# Patient Record
Sex: FEMALE | Race: BLACK OR AFRICAN AMERICAN | NOT HISPANIC OR LATINO | URBAN - NONMETROPOLITAN AREA
[De-identification: names, ages, dates, MRNs, and addresses within clinical notes are randomized per-mention and may not be internally consistent; named-entity substitution may affect disease eponyms.]

---

## 2017-10-18 NOTE — PATIENT DISCUSSION
no sig Rx shift since Feb 2017 visit but notes significant change in night driving glare in the last couple months.  However PCO not very significant I recommend night driving DVOs to help.  Rx given.  YAG is worse at night or persistent even with DVO Rx.

## 2020-02-24 NOTE — PATIENT DISCUSSION
Dermatologist Dr. Frantz Patterson, discussed if needed in the future, refer to  [facility].

## 2020-02-24 NOTE — PATIENT DISCUSSION
Recommend direct brow lift with laser buff through insurance (discussed risks and benefits of sx. .. ).

## 2020-12-01 ENCOUNTER — IMPORTED ENCOUNTER (OUTPATIENT)
Dept: URBAN - NONMETROPOLITAN AREA CLINIC 1 | Facility: CLINIC | Age: 85
End: 2020-12-01

## 2020-12-01 PROBLEM — H52.4: Noted: 2020-12-01

## 2020-12-01 PROBLEM — H25.13: Noted: 2020-12-01

## 2020-12-01 PROCEDURE — S0620 ROUTINE OPHTHALMOLOGICAL EXA: HCPCS

## 2020-12-01 NOTE — PATIENT DISCUSSION
Presbyopia OUDiscussed refractive status in detail with patient. MR done today but do not recommend updating due to cataract progression. Continue to monitor. Joseph OUDiscussed diagnosis with patient. Reviewed symptoms related to cataract progression. Discussed various treatment options with patient. Recommend cataract evaluation by Dr. Fernando Neri. Patient defers treatment at this time. Continue to monitor.

## 2021-02-03 ENCOUNTER — IMPORTED ENCOUNTER (OUTPATIENT)
Dept: URBAN - NONMETROPOLITAN AREA CLINIC 1 | Facility: CLINIC | Age: 86
End: 2021-02-03

## 2021-02-03 PROBLEM — H25.813: Noted: 2021-02-03

## 2021-02-03 PROCEDURE — 92014 COMPRE OPH EXAM EST PT 1/>: CPT

## 2021-02-03 NOTE — PATIENT DISCUSSION
Cataract(s)-Visually significant cataract OU .-Cataract(s) causing symptomatic impairment of visual function not correctable with a tolerable change in glasses or contact lenses lighting or non-operative means resulting in specific activity limitations and/or participation restrictions including but not limited to reading viewing television driving or meeting vocational or recreational needs. -Expectation is clearer vision and functional improvement in symptoms as well as reduced glare disability after cataract removal.-Order IOLMaster and OPD today. -Recommend Stand/Trad  based on today's OPD testing and lifestyle questionnaire.-All questions were answered regarding surgery including pre and post-op medications appointments activity restrictions and anesthetic usage.-The risks benefits and alternatives and special risk factors for the patient were discussed in detail including but not limited to: bleeding infection retinal detachment vitreous loss problems with the implant and possible need for additional surgery.-Although rare the possibility of complete vision loss was discussed.-The possible need for glasses post-operatively was discussed.-Order medical clearance exam based on history of hypertension -Patient elects to proceed with cataract surgery OS. Will schedule at patient's convenience and re-evaluate OD  in the future. Discussed all lens w/ patient and family Pt elects Stand/Trad and he wishes to proceed.  Will do patient under general anthesia

## 2021-02-25 ENCOUNTER — IMPORTED ENCOUNTER (OUTPATIENT)
Dept: URBAN - NONMETROPOLITAN AREA CLINIC 1 | Facility: CLINIC | Age: 86
End: 2021-02-25

## 2021-02-25 PROBLEM — Z01.818: Noted: 2021-02-25

## 2021-02-25 PROBLEM — I10: Noted: 2021-02-25

## 2021-02-25 PROCEDURE — 99212 OFFICE O/P EST SF 10 MIN: CPT

## 2021-02-25 NOTE — PATIENT DISCUSSION
"""Medical Clearance-Medical clearance done today. -No outstanding concerns that would preclude surgery.-Patient is cleared to proceed with scheduled surgery. "

## 2021-03-11 ENCOUNTER — IMPORTED ENCOUNTER (OUTPATIENT)
Dept: URBAN - NONMETROPOLITAN AREA CLINIC 1 | Facility: CLINIC | Age: 86
End: 2021-03-11

## 2021-03-11 PROBLEM — Z98.42: Noted: 2021-03-11

## 2021-03-11 PROBLEM — H25.811: Noted: 2021-03-11

## 2021-03-11 PROCEDURE — 99024 POSTOP FOLLOW-UP VISIT: CPT

## 2021-03-11 NOTE — PATIENT DISCUSSION
1 Day POV CE OS 3/10/21 Standard w Dimple Osborne- Discussed diagnosis in detail with patient- Patient is stable and doing well- Wound intact- Continue all post op drops as directed- Instilled a drop of Alphagan in OS today pressure 18- Continue to monitor- RTC 1 week POV Cataract OD -Visually significant.-Cataract causing symptomatic impairment of visual function not correctable with a tolerable change in glasses or contact lenses lighting or non-operative means resulting in specific activity limitations and/or participation restrictions including but not limited to reading viewing television driving or meeting vocational or recreational needs. -Expectation is clearer vision and reduced glare disability after cataract removal.-Refer to Dr Rosa De La Rosa for cataract evaluation

## 2021-03-16 ENCOUNTER — IMPORTED ENCOUNTER (OUTPATIENT)
Dept: URBAN - NONMETROPOLITAN AREA CLINIC 1 | Facility: CLINIC | Age: 86
End: 2021-03-16

## 2021-03-16 PROCEDURE — 99024 POSTOP FOLLOW-UP VISIT: CPT

## 2021-03-16 NOTE — PATIENT DISCUSSION
Cataract(s)-Visually significant cataract OD . -Cataract(s) causing symptomatic impairment of visual function not correctable with a tolerable change in glasses or contact lenses lighting or non-operative means resulting in specific activity limitations and/or participation restrictions including but not limited to reading viewing television driving or meeting vocational or recreational needs. -Expectation is clearer vision and functional improvement in symptoms as well as reduced glare disability after cataract removal.-Recommend Stand/Trad based on previous OPD testing and lifestyle questionnaire.-All questions were answered regarding surgery including pre and post-op medications appointments activity restrictions and anesthetic usage.-The risks benefits and alternatives and special risk factors for the patient were discussed in detail including but not limited to: bleeding infection retinal detachment vitreous loss problems with the implant and possible need for additional surgery.-Although rare the possibility of complete vision loss was discussed.-The need for glasses post-operatively was discussed.-Patient elects to proceed with cataract surgery OD . Will schedule at patient's convenience. *PT STATED TODAY HE WISHES TO HOLD OFF ON HAVING SURGERY OD AT THIS TIME AND RE EVALUATE AFTER OS HAS HAD ENOUGH TIME TO HEAL*s/p PCIOL-Pt doing well at 1 week s/p PCIOL. -Continue post-op gtts according to instruction sheet.-Okay to resume usual activites and d/c eye shield. -Patient to start Haydee 128 antoni QHS and Haydee 128 QID  Rx sent to pharmacy. -Discussed dense cataract and healing and post op time. -Will see patient again in 3 week for POV

## 2021-04-13 ENCOUNTER — IMPORTED ENCOUNTER (OUTPATIENT)
Dept: URBAN - NONMETROPOLITAN AREA CLINIC 1 | Facility: CLINIC | Age: 86
End: 2021-04-13

## 2021-04-13 PROCEDURE — 99024 POSTOP FOLLOW-UP VISIT: CPT

## 2021-04-13 NOTE — PATIENT DISCUSSION
s/p PCIOL 1 Month POV CE OS Stand/Trad + Dextenza -Pt doing well at 1 week s/p PCIOL. -Continue post-op gtts according to instruction sheet.-Okay to resume usual activites and d/c eye shield. Cataract OD -Visually significant.-Cataract(s) causing symptomatic impairment of visual function not correctable with a tolerable change in glasses or contact lenses lighting or non-operative means resulting in specific activity limitations and/or participation restrictions including but not limited to reading viewing television driving or meeting vocational or recreational needs. -Expectation is clearer vision and reduced glare disability after cataract removal.-Patient defers treatment at this time.

## 2022-01-11 NOTE — PROCEDURE NOTE: SURGICAL
<p>Prior to commencing surgery patient identification, surgical procedure, site, and side were confirmed by Dr. Coni Lopez. <span>&nbsp; </span>Following topical proparacaine anesthesia, the patient was positioned at the YAG laser, a contact lens coupled to the cornea of the right eye with methylcellulose and an axial posterior capsulotomy performed without complication using 3.0 Mj x 42. Attention was then turned to the left eye and a contact lens coupled to the cornea of the left eye with methylcellulose and an axial posterior capsulotomy performed without complication using 3.0 Mj x 24. One drop of Alphagan was instilled in both eyes and the patient returned to the holding area having tolerated the procedure well and without complication. </p>MR [id number]

## 2022-04-09 ASSESSMENT — VISUAL ACUITY
OD_GLARE: 20/100
OD_PAM: 20/25
OD_GLARE: 20/100
OD_PAM: 20/25
OS_AM: 20/70-
OS_CC: 20/100
OD_PH: 20/NI
OS_SC: 20/60
OD_SC: 20/50
OD_CC: 20/80+
OS_PH: 20/NI
OD_PAM: 20/25
OD_PH: 20/NI
OD_GLARE: 20/100
OD_SC: 20/80
OD_SC: 20/63
OS_SC: CF4'
OD_CC: 20/70
OS_PH: 20/NI
OS_AM: 20/70-
OU_CC: 20/40-

## 2022-04-09 ASSESSMENT — TONOMETRY
OD_IOP_MMHG: 11
OD_IOP_MMHG: 14
OS_IOP_MMHG: 08
OS_IOP_MMHG: 10
OS_IOP_MMHG: 11
OD_IOP_MMHG: 09
OS_IOP_MMHG: 18
OS_IOP_MMHG: 14
OD_IOP_MMHG: 18
OD_IOP_MMHG: 10

## 2022-04-20 NOTE — PATIENT DISCUSSION
Discussed condition and exacerbating conditions/situations (e.g., dry/arid environments, overhead fans, air conditioners, side effect of medications). Valtrex Pregnancy And Lactation Text: this medication is Pregnancy Category B and is considered safe during pregnancy. This medication is not directly found in breast milk but it's metabolite acyclovir is present.

## 2022-07-07 NOTE — PATIENT DISCUSSION
[facility]  [address] [place] [address]  [phone]    Initiation of Birth Control Pills    1. Please have protected intercourse with condoms at all times prior to starting birth control.   3. Your doctor sent a prescription for three months of birth control with 3 refills to your preferred pharmacy. You may start your birth control on the first day of heavy flow of your period. You should take your birth control pills EVERYDAY preferably at the same time in order to prevent breakthrough bleeding between your periods and to prevent pregnancy. Use condoms with each act of intercourse until you are used to taking your pill daily at the same time in order to prevent pregnancy.   4.  If you forget to take your pill, take it as soon as you remember and use condoms for the next 7 days. If you don't remember to take the pill you missed until the next day, take two pills the next day and use condoms for seven days. Expect breakthrough bleeding when you skips pills.   4. You may initially experience headaches, breast tenderness, nausea, vomiting, and spotting between your periods during the first three months of starting your birth control pills. Symptoms should subside after three months of use. If symptoms do not resolve after three months of initiating birth control pills, make an appointment to see your doctor for other contraceptive options.   5. There is a risk of developing blood clots in your legs/heart vessels while on birth control. The risks are greater in women who have a strong family history of blood clots, who smoke or lead a sedentary lifestyle. Please notify your doctor if you or your first degree relative have experienced blood clots in the legs (deep vein thrombosis) or lungs (pulmonary embolism).  Also, call your doctor if you experience pain, redness, or swelling of your legs (particularly your calves) or have sudden chest pain or shortness of breath while taking birth control.  Discussed the risks/benefits of YAG Laser Capsulotomy. Last seen 6/8/2022  Next appt 12/7/2022

## 2023-07-05 ENCOUNTER — COMPREHENSIVE EXAM (OUTPATIENT)
Dept: URBAN - NONMETROPOLITAN AREA CLINIC 1 | Facility: CLINIC | Age: 88
End: 2023-07-05

## 2023-07-05 DIAGNOSIS — H52.4: ICD-10-CM

## 2023-07-05 PROCEDURE — S0621 ROUTINE OPHTHALMOLOGICAL EXA: HCPCS

## 2023-07-05 ASSESSMENT — TONOMETRY
OS_IOP_MMHG: 12
OD_IOP_MMHG: 12

## 2023-07-05 ASSESSMENT — VISUAL ACUITY
OD_CC: 20/70
OS_CC: 20/70
OU_CC: 20/25
OS_PH: 20/50-2